# Patient Record
Sex: MALE | Race: WHITE | NOT HISPANIC OR LATINO | Employment: OTHER | URBAN - METROPOLITAN AREA
[De-identification: names, ages, dates, MRNs, and addresses within clinical notes are randomized per-mention and may not be internally consistent; named-entity substitution may affect disease eponyms.]

---

## 2023-01-04 RX ORDER — OXYCODONE AND ACETAMINOPHEN 5; 325 MG/1; MG/1
1 TABLET ORAL EVERY 4 HOURS PRN
COMMUNITY
Start: 2022-09-13

## 2023-01-04 RX ORDER — ZOLPIDEM TARTRATE 12.5 MG/1
12.5 TABLET, FILM COATED, EXTENDED RELEASE ORAL
COMMUNITY

## 2023-01-04 RX ORDER — ONDANSETRON 4 MG/1
4 TABLET, FILM COATED ORAL EVERY 8 HOURS PRN
COMMUNITY
Start: 2022-09-26

## 2023-01-04 RX ORDER — CARVEDILOL 3.12 MG/1
3.12 TABLET ORAL 2 TIMES DAILY
COMMUNITY

## 2023-01-04 RX ORDER — ASPIRIN 81 MG/1
81 TABLET ORAL EVERY MORNING
COMMUNITY

## 2023-01-04 RX ORDER — TAMSULOSIN HYDROCHLORIDE 0.4 MG/1
0.4 CAPSULE ORAL DAILY
COMMUNITY

## 2023-01-04 RX ORDER — OMEPRAZOLE 20 MG/1
20 CAPSULE, DELAYED RELEASE ORAL DAILY
COMMUNITY
Start: 2022-10-13

## 2023-01-04 RX ORDER — MELOXICAM 15 MG/1
15 TABLET ORAL EVERY OTHER DAY
COMMUNITY

## 2023-01-04 RX ORDER — EZETIMIBE 10 MG/1
10 TABLET ORAL DAILY
COMMUNITY

## 2023-01-04 RX ORDER — AMLODIPINE BESYLATE 5 MG/1
2.5 TABLET ORAL EVERY MORNING
COMMUNITY

## 2023-01-04 RX ORDER — DIAZEPAM 5 MG/1
5 TABLET ORAL EVERY 6 HOURS PRN
COMMUNITY

## 2023-02-23 ENCOUNTER — OFFICE VISIT (OUTPATIENT)
Dept: NEPHROLOGY | Facility: CLINIC | Age: 58
End: 2023-02-23
Payer: MEDICARE

## 2023-02-23 VITALS
WEIGHT: 243 LBS | SYSTOLIC BLOOD PRESSURE: 136 MMHG | OXYGEN SATURATION: 96 % | DIASTOLIC BLOOD PRESSURE: 85 MMHG | HEART RATE: 53 BPM | BODY MASS INDEX: 34.02 KG/M2 | HEIGHT: 71 IN

## 2023-02-23 DIAGNOSIS — N18.31 STAGE 3A CHRONIC KIDNEY DISEASE: Primary | ICD-10-CM

## 2023-02-23 DIAGNOSIS — E22.0 ACROMEGALY: ICD-10-CM

## 2023-02-23 DIAGNOSIS — I10 PRIMARY HYPERTENSION: ICD-10-CM

## 2023-02-23 PROCEDURE — 99213 OFFICE O/P EST LOW 20 MIN: CPT | Mod: S$GLB,,, | Performed by: INTERNAL MEDICINE

## 2023-02-23 PROCEDURE — 99213 PR OFFICE/OUTPT VISIT, EST, LEVL III, 20-29 MIN: ICD-10-PCS | Mod: S$GLB,,, | Performed by: INTERNAL MEDICINE

## 2023-02-23 RX ORDER — TESTOSTERONE CYPIONATE 200 MG/ML
INJECTION, SOLUTION INTRAMUSCULAR
COMMUNITY

## 2023-02-23 NOTE — PROGRESS NOTES
Pt Name:  Jonnie Aldana  Pt :  1965  Pt MRN:  [id number]    Date: 2023  Provider: Nichelle Burgos    Reason for visit: seeing for ckd.      Chief Complaint:   Chief Complaint   Patient presents with    Chronic Kidney Disease     Serum creatinine 1.65, GFR 47, CKD stage 3       HPI:  [unfilled]  No complaints.? Increase in finger size.      History:   Past Medical History:   Diagnosis Date    Chronic kidney disease, unspecified     Diverticulosis     Essential (primary) hypertension     Gastritis     Internal hemorrhoids     Mitral valve prolapse     Personal history of colonic polyps 2011    adenomatous polyp    Personal history of colonic polyps 2007    hyperplastic polyps    Personal history of colonic polyps 2004    hyperplastic polyp,adenomatous polyps    Post-operative nausea and vomiting      Past Surgical History:   Procedure Laterality Date    ANAL FISSURECTOMY      BACK SURGERY      CARDIAC CATHETERIZATION Bilateral     CHOLECYSTECTOMY      COLONOSCOPY      22, 12-3-15    HERNIA REPAIR      NECK FUSION      SURGICAL REMOVAL OF LESION OF INTESTINE      10 inches    TONSILLECTOMY      UPPER GASTROINTESTINAL ENDOSCOPY       History reviewed. No pertinent family history.  Social History     Substance and Sexual Activity   Alcohol Use Not Currently     Social History     Substance and Sexual Activity   Drug Use Not Currently     Social History     Substance and Sexual Activity   Sexual Activity Yes    Partners: Female     reports being sexually active and has had partner(s) who are female.  Social History     Tobacco Use   Smoking Status Never   Smokeless Tobacco Never       Allergies:  Review of patient's allergies indicates:   Allergen Reactions    Cyclobenzaprine     Nsaids (non-steroidal anti-inflammatory drug)      Other reaction(s): Other (See Comments)  Renal disease       [unfilled]    ROS:   Constitutional:  Denies fever or chills   Eyes:  Denies change in visual  "acuity   HENT:  Denies nasal congestion or sore throat   Respiratory:  As in the history of the present illness.   Cardiovascular:  As in the history of the present illness.   GI:  As in the history of the present illness.    Musculoskeletal:  Denies back pain or joint pain   Integument:  Denies rash   Neurologic:  Denies headache, focal weakness or sensory changes   Endocrine:  Denies polyuria or polydipsia   Lymphatic:  Denies swollen glands   Psychiatric:  Denies depression or anxiety    Physical Exam:   Vitals:   Vitals:    02/23/23 1117   BP: 136/85   Pulse: (!) 53   SpO2: 96%   Weight: 110.2 kg (243 lb)   Height: 5' 11" (1.803 m)     Body mass index is 33.89 kg/m².    Constitutional:  Well developed, well nourished, and in no acute distress   Eyes:  PERRLA, conjunctiva normal   HENT:  Atraumatic, external ears normal, nose normal.  Neck: There is no jugular venous distension or thyromegaly.   Respiratory:  No respiratory distress, normal breath sounds, no rales, no wheezing   Cardiovascular:  Normal rate, and a regular rhythm, no murmurs, no gallops, no rub, and no edema.  GI:  Normal bowel sounds.  Musculoskeletal:  No deformities.   Neurologic:  Alert & oriented x 3, CN 2-12 normal, normal motor function, and no asterixis.   Psychiatric:  Speech and behavior appropriate.    Labs/Tests:  Lab Results   Component Value Date     08/26/2022    K 4.4 08/26/2022     08/26/2022    CO2 31 08/26/2022    BUN 14 08/26/2022    CREATININE 1.57 (H) 08/26/2022    CREATININE 1.83 (H) 06/29/2022    CREATININE 2.17 (H) 06/28/2022    GLUCOSE Negative 02/20/2023    CALCIUM 9.9 08/26/2022    PHOSPHORUS 2.7 06/29/2022    ALBUMIN 4.6 06/16/2022    EGFRNONAA 47 (L) 08/26/2022    EGFRNONAA 39 (L) 06/29/2022    EGFRNONAA 32 (L) 06/28/2022    ESTGFRAFRICA 55 (L) 08/26/2022    ESTGFRAFRICA 46 (L) 06/29/2022    ESTGFRAFRICA 37 (L) 06/28/2022    PTH 35 02/20/2023    HGB 15.5 02/20/2023    HGB 15.5 08/26/2022    HGB 14.6 " 06/29/2022    TRIG 249 (H) 07/16/2021    CHOL 228 (H) 07/16/2021    HDL 38 (L) 07/16/2021    LDLCALC 140 (H) 07/16/2021    LABVLDL 50 (H) 07/16/2021       Assessment & Plan:    Visit Diagnosis:   [unfilled]  [unfilled]    Problem List: There is no problem list on file for this patient.    Rfp, cbc, ua and pth noted.    Ckd 3a - stable. Continue meds.    Htn stable - continue meds    ? Acromegaly - check igf.    Follow Up:   Follow up in about 1 year (around 2/23/2024).